# Patient Record
Sex: MALE | Race: WHITE | Employment: OTHER | URBAN - METROPOLITAN AREA
[De-identification: names, ages, dates, MRNs, and addresses within clinical notes are randomized per-mention and may not be internally consistent; named-entity substitution may affect disease eponyms.]

---

## 2020-08-15 ENCOUNTER — HOSPITAL ENCOUNTER (OUTPATIENT)
Dept: PREADMISSION TESTING | Age: 80
Discharge: HOME | End: 2020-08-15
Payer: MEDICARE

## 2020-08-15 PROCEDURE — 87635 SARS-COV-2 COVID-19 AMP PRB: CPT

## 2020-08-16 LAB
HEALTH STATUS, XMCV2T: NORMAL
SARS-COV-2, COV2NT: NOT DETECTED
SOURCE, COVRS: NORMAL
SPECIMEN SOURCE, FCOV2M: NORMAL
SPECIMEN TYPE, XMCV1T: NORMAL

## 2020-08-18 RX ORDER — HYDROGEN PEROXIDE 3 %
20 SOLUTION, NON-ORAL MISCELLANEOUS DAILY
COMMUNITY

## 2020-08-18 RX ORDER — ASPIRIN 81 MG/1
81 TABLET ORAL DAILY
COMMUNITY

## 2020-08-18 NOTE — PERIOP NOTES
Spoke w Sanjay Gee re; labs & Ekg. She will check the [facility] records & cb. Per Sanjay Gee; she was able to obtain ekg & labs & will fax to me. Spoke w Mr. Misha Lopez making him aware that office was able to obtain everything that is required.

## 2020-08-19 ENCOUNTER — ANESTHESIA (OUTPATIENT)
Dept: ENDOSCOPY | Age: 80
End: 2020-08-19
Payer: MEDICARE

## 2020-08-19 ENCOUNTER — ANESTHESIA EVENT (OUTPATIENT)
Dept: ENDOSCOPY | Age: 80
End: 2020-08-19
Payer: MEDICARE

## 2020-08-19 ENCOUNTER — HOSPITAL ENCOUNTER (OUTPATIENT)
Age: 80
Setting detail: OUTPATIENT SURGERY
Discharge: HOME | End: 2020-08-19
Attending: INTERNAL MEDICINE | Admitting: INTERNAL MEDICINE
Payer: MEDICARE

## 2020-08-19 VITALS
OXYGEN SATURATION: 99 % | BODY MASS INDEX: 30.76 KG/M2 | TEMPERATURE: 97.6 F | SYSTOLIC BLOOD PRESSURE: 121 MMHG | HEIGHT: 73 IN | RESPIRATION RATE: 17 BRPM | WEIGHT: 232.1 LBS | HEART RATE: 60 BPM | DIASTOLIC BLOOD PRESSURE: 60 MMHG

## 2020-08-19 PROCEDURE — 77030019957 HC CUF BLN GASTSCP OCOA -B: Performed by: INTERNAL MEDICINE

## 2020-08-19 PROCEDURE — 76040000019: Performed by: INTERNAL MEDICINE

## 2020-08-19 PROCEDURE — 74011250636 HC RX REV CODE- 250/636: Performed by: INTERNAL MEDICINE

## 2020-08-19 PROCEDURE — 77030003406 HC NDL ASPIR BIOP OCOA -C: Performed by: INTERNAL MEDICINE

## 2020-08-19 PROCEDURE — 77030010936 HC CLP LIG BSC -C: Performed by: INTERNAL MEDICINE

## 2020-08-19 PROCEDURE — 74011250636 HC RX REV CODE- 250/636: Performed by: ANESTHESIOLOGY

## 2020-08-19 PROCEDURE — 76060000031 HC ANESTHESIA FIRST 0.5 HR: Performed by: INTERNAL MEDICINE

## 2020-08-19 PROCEDURE — 74011000250 HC RX REV CODE- 250: Performed by: ANESTHESIOLOGY

## 2020-08-19 RX ORDER — DEXTROMETHORPHAN/PSEUDOEPHED 2.5-7.5/.8
1.2 DROPS ORAL
Status: DISCONTINUED | OUTPATIENT
Start: 2020-08-19 | End: 2020-08-19 | Stop reason: HOSPADM

## 2020-08-19 RX ORDER — SODIUM CHLORIDE 0.9 % (FLUSH) 0.9 %
5-40 SYRINGE (ML) INJECTION EVERY 8 HOURS
Status: DISCONTINUED | OUTPATIENT
Start: 2020-08-19 | End: 2020-08-19 | Stop reason: HOSPADM

## 2020-08-19 RX ORDER — PROPOFOL 10 MG/ML
INJECTION, EMULSION INTRAVENOUS AS NEEDED
Status: DISCONTINUED | OUTPATIENT
Start: 2020-08-19 | End: 2020-08-19 | Stop reason: HOSPADM

## 2020-08-19 RX ORDER — EPINEPHRINE 0.1 MG/ML
1 INJECTION INTRACARDIAC; INTRAVENOUS
Status: DISCONTINUED | OUTPATIENT
Start: 2020-08-19 | End: 2020-08-19 | Stop reason: HOSPADM

## 2020-08-19 RX ORDER — SODIUM CHLORIDE 0.9 % (FLUSH) 0.9 %
5-40 SYRINGE (ML) INJECTION AS NEEDED
Status: DISCONTINUED | OUTPATIENT
Start: 2020-08-19 | End: 2020-08-19 | Stop reason: HOSPADM

## 2020-08-19 RX ORDER — ATROPINE SULFATE 0.1 MG/ML
0.5 INJECTION INTRAVENOUS
Status: DISCONTINUED | OUTPATIENT
Start: 2020-08-19 | End: 2020-08-19 | Stop reason: HOSPADM

## 2020-08-19 RX ORDER — NALOXONE HYDROCHLORIDE 0.4 MG/ML
0.4 INJECTION, SOLUTION INTRAMUSCULAR; INTRAVENOUS; SUBCUTANEOUS
Status: DISCONTINUED | OUTPATIENT
Start: 2020-08-19 | End: 2020-08-19 | Stop reason: HOSPADM

## 2020-08-19 RX ORDER — FLUMAZENIL 0.1 MG/ML
0.2 INJECTION INTRAVENOUS
Status: DISCONTINUED | OUTPATIENT
Start: 2020-08-19 | End: 2020-08-19 | Stop reason: HOSPADM

## 2020-08-19 RX ORDER — LIDOCAINE HYDROCHLORIDE 20 MG/ML
INJECTION, SOLUTION EPIDURAL; INFILTRATION; INTRACAUDAL; PERINEURAL AS NEEDED
Status: DISCONTINUED | OUTPATIENT
Start: 2020-08-19 | End: 2020-08-19 | Stop reason: HOSPADM

## 2020-08-19 RX ORDER — SODIUM CHLORIDE 9 MG/ML
75 INJECTION, SOLUTION INTRAVENOUS CONTINUOUS
Status: DISCONTINUED | OUTPATIENT
Start: 2020-08-19 | End: 2020-08-19 | Stop reason: HOSPADM

## 2020-08-19 RX ADMIN — LIDOCAINE HYDROCHLORIDE 100 MG: 20 INJECTION, SOLUTION EPIDURAL; INFILTRATION; INTRACAUDAL; PERINEURAL at 12:48

## 2020-08-19 RX ADMIN — SODIUM CHLORIDE 75 ML/HR: 900 INJECTION, SOLUTION INTRAVENOUS at 11:45

## 2020-08-19 RX ADMIN — PROPOFOL 240 MG: 10 INJECTION, EMULSION INTRAVENOUS at 13:13

## 2020-08-19 NOTE — ROUTINE PROCESS
[facility] 1940 
[id number] Situation: 
Verbal report received from: Marry Munguia Procedure: Procedure(s): ENDOSCOPIC ULTRASOUND (EUS) WITH FIDUCIAL 
ESOPHAGOGASTRODUODENOSCOPY (EGD) RESOLUTION CLIP Background: 
 
Preoperative diagnosis: ESOPHAGEAL CARCINOMA Postoperative diagnosis: esophageal carcinoma :  Dr. Teodoro Read Assistant(s): Endoscopy Technician-1: Kami Nino Endoscopy RN-1: Jennifer Oviedo Endoscopy RN-2: Collette Ellis RN Specimens: * No specimens in log * H. Pylori  no Assessment: 
Intra-procedure medications [id number]::\"100\"} mcg Anesthesia gave intra-procedure sedation and medications, see anesthesia flow sheet yes Intravenous fluids: NS@ [facility] Vital signs stable Abdominal assessment: round and soft Recommendation: 
Discharge patient per MD order. Return to floor Family or Friend Permission to share finding with family or friend yes

## 2020-08-19 NOTE — PROCEDURES
NAME:  Manish Duenas   :   1940   MRN:   [id number]     S[facility] RODRIGUEZ [facility]    Date/Time:  2020   Procedure Type: EGD/Linear EUS    Indications: Esophageal adenocarcinoma    Pre-operative Diagnosis: see indication above    Post-operative Diagnosis:  See findings below    : Alize Zavaleta MD    Referring Provider: Jonathan Whitney, Doreen Ramires MD    Procedure Details:    Exam:  Airway: clear, no airway problems anticipated  Heart: RRR, without gallops or rubs  Lungs: clear bilaterally without wheezes, crackles, or rhonchi  Abdomen: soft, nontender, nondistended, bowel sounds present  Mental Status: awake, alert and oriented to person, place and time     Anethesia/Sedation:  MAC anesthesia Propofol      Procedure Details   After infom consent was obtained for the procedure, with all risks and benefits of procedure explained the patient was taken to the endoscopy suite and placed in the left lateral decubitus position. Following sequential administration of sedation as per above, the linear echoendoscope was inserted into the mouth and advanced under direct vision to second portion of the duodenum. A careful inspection was made as the gastroscope was withdrawn, including a retroflexed view of the proximal stomach; findings and interventions are described below. Findings:     Endoscopic:  1. Normal proximal and mid esophagus  2. Post-EMR scar seen at 39 cm distal to the incisors without evidence of recurrent residual tumor. Two hemostatic clips placed for marking and possible hemostasis as well  3. 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 Medium, sliding hiatal hernia  5. Normal stomach  6. Normal duodenum    Ultrasound: Two fiducial markers were successfully placed guided at post-EMR scar        Specimen Removed:  None    Complications: None. EBL:  None. Recommendations:   1.  Follow up with Dr. Nicole Bustos as planned    Porfirio Fontaine MD

## 2020-08-19 NOTE — ANESTHESIA POSTPROCEDURE EVALUATION
Procedure(s):  ENDOSCOPIC ULTRASOUND (EUS) WITH FIDUCIAL  ESOPHAGOGASTRODUODENOSCOPY (EGD)  RESOLUTION CLIP.    total IV anesthesia    Anesthesia Post Evaluation        Patient location during evaluation: PACU  Note status: Adequate. Level of consciousness: responsive to verbal stimuli and sleepy but conscious  Pain management: satisfactory to patient  Airway patency: patent  Anesthetic complications: no  Cardiovascular status: acceptable  Respiratory status: acceptable  Hydration status: acceptable  Comments: +Post-Anesthesia Evaluation and Assessment    Patient: Jese Oliva MRN: [id number]  SSN: [id number]   YOB: 1940  Age: [de-identified] y.o. Sex: male      Cardiovascular Function/Vital Signs    /60   Pulse 60   Temp 36.4 °C (97.6 °F)   Resp 17   Ht 6' 1\" (1.854 m)   Wt 105.3 kg (232 lb 1.6 oz)   SpO2 99%   BMI 30.62 kg/m²     Patient is status post Procedure(s):  ENDOSCOPIC ULTRASOUND (EUS) WITH FIDUCIAL  ESOPHAGOGASTRODUODENOSCOPY (EGD)  RESOLUTION CLIP. Nausea/Vomiting: Controlled. Postoperative hydration reviewed and adequate. Pain:  Pain Scale 1: Numeric (0 - 10) (08/19/20 1348)  Pain Intensity 1: 0 (08/19/20 1348)   Managed. Neurological Status: At baseline. Mental Status and Level of Consciousness: Arousable. Pulmonary Status:   O2 Device: Room air (08/19/20 1348)   Adequate oxygenation and airway patent. Complications related to anesthesia: None    Post-anesthesia assessment completed. No concerns. Signed By: Wilbur Roland DO    8/19/2020  Post anesthesia nausea and vomiting:  controlled      INITIAL Post-op Vital signs:   Vitals Value Taken Time   /60 8/19/2020  1:48 PM   Temp 36.4 °C (97.6 °F) 8/19/2020  1:33 PM   Pulse 61 8/19/2020  1:52 PM   Resp 18 8/19/2020  1:52 PM   SpO2 98 % 8/19/2020  1:51 PM   Vitals shown include unvalidated device data.

## 2020-08-19 NOTE — DISCHARGE INSTRUCTIONS
Maury Watkins  [id number]  1940    EUS DISCHARGE INSTRUCTIONS  Discomfort:  Sore throat- throat lozenges or warm salt water gargle  redness at IV site- apply warm compress to area; if redness or soreness persist- contact your physician  Gaseous discomfort- walking, belching will help relieve any discomfort  You may not operate a vehicle for 12 hours  You may not engage in an occupation involving machinery or appliances for rest of today  You may not drink alcoholic beverages for at least 12 hours  Avoid making any critical decisions for at least 24 hour  DIET  You may resume your regular diet - however -  remember your colon is empty and a heavy meal will produce gas. Avoid these foods:  vegetables, fried / greasy foods, carbonated drinks    MEDICATIONS:  Per Medication Reconciliation  Resume Coumadin on 8/21 and Aspirin on 8/20      ACTIVITY  You may resume your normal daily activities until tomorrow AM;  Spend the remainder of the day resting -  avoid any strenuous activity. CALL M.D. ANY SIGN OF   Increasing pain, nausea, vomiting  Abdominal distension (swelling)  New increased bleeding (oral or rectal)  Fever (chills)  Pain in chest area  Bloody discharge from nose or mouth  Shortness of breath    You may not take any Advil,  Ibuprofen, Motrin, Aleve, or Goodys for 10 days, ONLY  Tylenol as needed for pain. IMPRESSION:  Endoscopic:  1. Normal proximal and mid esophagus  2. Post-EMR scar seen at 39 cm distal to the incisors without evidence of recurrent residual tumor. Two hemostatic clips placed for marking and possible hemostasis as well  3. 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 Medium, sliding hiatal hernia  5. Normal stomach  6. Normal duodenum    Ultrasound: Two fiducial markers were successfully placed guided at post-EMR scar     Recommendations:   1.  Follow up with Dr. Guillermo Pompa as planned    Follow-up Instructions:   Telephone # [phone]    Heraclio Christensen MD

## 2020-08-19 NOTE — PROGRESS NOTES
Both written and verbal discharge instructions given to patient with understanding verbalized by patient.

## 2020-08-19 NOTE — ANESTHESIA PREPROCEDURE EVALUATION
Relevant Problems   No relevant active problems       Anesthetic History   No history of anesthetic complications            Review of Systems / Medical History  Patient summary reviewed, nursing notes reviewed and pertinent labs reviewed    Pulmonary  Within defined limits                 Neuro/Psych   Within defined limits           Cardiovascular            Dysrhythmias   Pacemaker         GI/Hepatic/Renal     GERD          Comments: Esophageal Cancer  Crow's Esophagus Endo/Other        Cancer    Comments: Esophageal Cancer Other Findings              Physical Exam    Airway  Mallampati: II  TM Distance: > 6 cm  Neck ROM: normal range of motion   Mouth opening: Normal     Cardiovascular  Regular rate and rhythm,  S1 and S2 normal,  no murmur, click, rub, or gallop             Dental    Dentition: Bridges  Comments: Upper bridge   Pulmonary  Breath sounds clear to auscultation               Abdominal  GI exam deferred       Other Findings            Anesthetic Plan    ASA: 3  Anesthesia type: MAC and total IV anesthesia          Induction: Intravenous  Anesthetic plan and risks discussed with: Patient

## 2020-08-19 NOTE — H&P
Gastroenterology Outpatient History and Physical    Patient: Jese Oliva    Physician: Sagar Casillas MD    Chief Complaint: Esophageal adenocarcinoma s/p EMR  History of Present Illness: No complaints    History:  Past Medical History:   Diagnosis Date    Arrhythmia     Cancer ([facility]     esophageal cancer    GERD (gastroesophageal reflux disease)     barretts esoph    Thromboembolus ([facility] 2010    left shoulder and right leg      Past Surgical History:   Procedure Laterality Date    ABDOMEN SURGERY PROC UNLISTED  2020    UHR    HX APPENDECTOMY      HX OTHER SURGICAL      [employer] 3rd grade nasal- 2nd grade    HX PACEMAKER Left 2009    [employer]      Social History     Socioeconomic History    Marital status:      Spouse name: Not on file    Number of children: Not on file    Years of education: Not on file    Highest education level: Not on file   Tobacco Use    Smoking status: Never Smoker    Smokeless tobacco: Never Used   Substance and Sexual Activity    Alcohol use: Yes     Alcohol/week: 0.8 standard drinks     Types: 1 Glasses of wine per week     Comment: every other day    Drug use: No      Family History   Problem Relation Age of Onset    Hypertension Mother     Cancer Father         prostate    Alzheimer Brother     There is no problem list on file for this patient. Allergies: No Known Allergies  Medications:   Prior to Admission medications    Medication Sig Start Date End Date Taking? Authorizing Provider   esomeprazole (NexIUM) 20 mg capsule Take 20 mg by mouth daily. Yes Provider, Historical   aspirin delayed-release 81 mg tablet Take 81 mg by mouth daily. Yes Provider, Historical   simvastatin (ZOCOR) 10 mg tablet Take 20 mg by mouth nightly. Yes Provider, Historical   PARoxetine (PAXIL) 20 mg tablet Take 20 mg by mouth daily. Yes Provider, Historical   sotalol (BETAPACE) 120 mg tablet Take 120 mg by mouth two (2) times a day.    Yes Provider, Historical   multivitamins-minerals-lutein (MEN'S CENTRUM SILVER WITH LUTEIN) tab tablet Take 1 Tab by mouth daily. Yes Provider, Historical   warfarin (COUMADIN) 7.5 mg tablet Take 7.5 mg by mouth daily. Alternates with 5 mg every day    Provider, Historical   warfarin (COUMADIN) 5 mg tablet Take 5 mg by mouth daily. Alternates with 7.5 mg    Provider, Historical     Physical Exam:   Vital Signs: Blood pressure 132/66, pulse 60, temperature 98.1 °F (36.7 °C), resp. rate 12, height 6' 1\" (1.854 m), weight 105.3 kg (232 lb 1.6 oz), SpO2 98 %.   General: well developed, well nourished   HEENT: unremarkable   Heart: regular rhythm no mumur    Lungs: clear   Abdominal:  benign   Neurological: unremarkable   Extremities: no edema     Findings/Diagnosis: Esophageal adenocarcinoma s/p EMR  Plan of Care/Planned Procedure: EUS with fiducials with conscious/deep sedation    Signed:  Prakash Navarrete MD 8/19/2020

## (undated) DEVICE — Device

## (undated) DEVICE — CATH IV AUTOGRD BC PNK 20GA 25 -- INSYTE

## (undated) DEVICE — 1200 GUARD II KIT W/5MM TUBE W/O VAC TUBE: Brand: GUARDIAN

## (undated) DEVICE — SYR 3ML LL TIP 1/10ML GRAD --

## (undated) DEVICE — Device: Brand: SINGLE USE ASPIRATION NEEDLE

## (undated) DEVICE — SET ADMIN 16ML TBNG L100IN 2 Y INJ SITE IV PIGGY BK DISP

## (undated) DEVICE — BASIN EMSIS 16OZ GRAPHITE PLAS KID SHP MOLD GRAD FOR ORAL

## (undated) DEVICE — NEEDLE HYPO 18GA L1.5IN PNK S STL HUB POLYPR SHLD REG BVL

## (undated) DEVICE — TOWEL 4 PLY TISS 19X30 SUE WHT

## (undated) DEVICE — YANKAUER,TAPERED BULBOUS TIP,W/O VENT: Brand: MEDLINE

## (undated) DEVICE — ELECTRODE,RADIOTRANSLUCENT,FOAM,5PK: Brand: MEDLINE

## (undated) DEVICE — Z DISCONTINUED PER MEDLINE LINE GAS SAMPLING O2/CO2 LNG AD 13 FT NSL W/ TBNG FILTERLINE

## (undated) DEVICE — Device: Brand: BALLOON3

## (undated) DEVICE — NEONATAL-ADULT SPO2 SENSOR: Brand: NELLCOR

## (undated) DEVICE — SOLIDIFIER MEDC 1200ML -- CONVERT TO 356117

## (undated) DEVICE — SYR 10ML LUER LOK 1/5ML GRAD --

## (undated) DEVICE — CLIP INT L235CM WRK CHAN DIA2.8MM OPN 11MM LCK MECHANISM MR

## (undated) DEVICE — SOLUTION IRRIG 1000ML H2O STRL BLT

## (undated) DEVICE — BLOCK BITE ENDOSCP AD 21 MM W/ DIL BLU LF DISP